# Patient Record
Sex: MALE | Race: WHITE | URBAN - METROPOLITAN AREA
[De-identification: names, ages, dates, MRNs, and addresses within clinical notes are randomized per-mention and may not be internally consistent; named-entity substitution may affect disease eponyms.]

---

## 2017-05-09 ENCOUNTER — OFFICE VISIT (OUTPATIENT)
Dept: RADIATION THERAPY | Facility: OUTPATIENT CENTER | Age: 77
End: 2017-05-09

## 2017-05-09 VITALS
WEIGHT: 197 LBS | DIASTOLIC BLOOD PRESSURE: 73 MMHG | BODY MASS INDEX: 29.95 KG/M2 | HEART RATE: 69 BPM | SYSTOLIC BLOOD PRESSURE: 147 MMHG

## 2017-05-09 DIAGNOSIS — C61 PROSTATE CANCER (H): Primary | ICD-10-CM

## 2017-05-09 NOTE — MR AVS SNAPSHOT
After Visit Summary   5/9/2017    Humza Santamaria    MRN: [id number]           Patient Information     Date Of Birth          1940        Visit Information        Provider Department      5/9/2017 11:15 AM Kiersten Solitario MD [facility]         Follow-ups after your visit        Your next 10 appointments already scheduled     May 08, 2018 11:15 AM CDT   Return Visit with Kiersten Solitario MD   [facility] ([facility])    [address], [address]  [place] [address]   [phone]              Who to contact     Please call your clinic at [phone] to:    Ask questions about your health    Make or cancel appointments    Discuss your medicines    Learn about your test results    Speak to your doctor   If you have compliments or concerns about an experience at your clinic, or if you wish to file a complaint, please contact [facility] Patient Relations at [phone] or email us at [email or web address]         Additional Information About Your Visit        Care EveryWhere ID     This is your Care EveryWhere ID. This could be used by other organizations to access your [place] medical records  [id number]        Your Vitals Were     Pulse BMI (Body Mass Index)                69 29.95 kg/m2           Blood Pressure from Last 3 Encounters:   05/09/17 147/73   05/10/16 166/90   05/12/15 142/83    Weight from Last 3 Encounters:   05/09/17 89.4 kg (197 lb)   05/10/16 91.6 kg (202 lb)   05/12/15 88.3 kg (194 lb 9.6 oz)              Today, you had the following     No orders found for display       Primary Care Provider Office Phone # Fax #    Autumn Rodriguez [phone]       [address] [address]        Thank you!     Thank you for choosing [facility]  for your care. Our goal is always to provide you with excellent care. Hearing back from our patients is one way we  can continue to improve our services. Please take a few minutes to complete the written survey that you may receive in the mail after your visit with us. Thank you!             Your Updated Medication List - Protect others around you: Learn how to safely use, store and throw away your medicines at www.disposemymeds.org.          This list is accurate as of: 5/9/17 11:31 AM.  Always use your most recent med list.                   Brand Name Dispense Instructions for use    aspirin 325 MG tablet      Take 325 mg by mouth daily.       lisinopril-hydrochlorothiazide 20-12.5 MG per tablet    PRINZIDE/ZESTORETIC     Take 1 tablet by mouth daily.       simvastatin 20 MG tablet    ZOCOR     Take 20 mg by mouth At Bedtime.

## 2017-05-09 NOTE — LETTER
5/9/2017      RE: Humza Santamaria  [address] [address]       DISEASE TREATED: Adenocarcinoma of the prostate, clinical stage [id number], Raúl score 3+3=6 and PSA previously low. With progression, new [place] score 3+4=7. After active surveillance, PSA 7.8. Status post brachytherapy alone.    RADIATION THERAPY DELIVERED: Implants only, 144 Gy using iodine-125 seeds    INTERVAL SINCE COMPLETION OF RT: Nearly 5 years since implantation on 07/13/2012     SUBJECTIVE:  Mr. Santamaria is a 76yo gentleman diagnosed with prostate cancer in 05/2010. Originally he had a PSA of 7.9 and a Raúl score of 3+3=6. At that time, he proceeded with active surveillance. He was followed with repeat biopsies and ultimately was found to have a Raúl score of 3+4=7 documented in 05/2012. His PSA prior to implant was 7.8 in 04/2012. His pre-implant AUA was 2, and his sexual function was low as he was not often sexually active; however, he did admit to potent erectile function prior to brachytherapy. After his implant, he was placed on Flomax for 2 months.    He now presents for routine followup. Since his last visit with us one year ago, he reports that he has continued to do very well. He has no pressing issues or complaints today. He denies any significant urinary or bowel symptoms. AUA score today is 3/35; he does not fill out his MONET score as he is not sexually active. Most recent PSA earlier this month was stable and low at 0.03. His remaining ROS are otherwise unremarkable.     PHYSICAL EXAM:  VITALS: /73 (BP Location: Left arm, Cuff Size: Adult Regular)  Pulse 69  Wt 89.4 kg (197 lb)  BMI 29.95 kg/m2  GEN: Appears well, alert, oriented, and in NAD  HEENT: NCAT, EOMI, normal conjunctiva  RESP: Breathing comfortably on room air  DIVINA: Deferred  SKIN: Normal color and turgor    LABS AND IMAGING: Reviewed.    IMPRESSION:   Mr. Santamaria is a 75 year old male with intermediate risk prostate cancer status post  definitive brachytherapy treatment, implanted almost 4 years ago. Symptomatically he is stable. He is biochemically and clinically MARI.     PLAN:  1. RTC in 1 year with PSA  2. Continue routine health maintenance with PCP    Tomasa Hina was seen and discussed with staff, Dr. Solitario.    Robson Bliss MD PGY-3  Radiation Oncology Resident, [facility]  Phone: [phone]      I have personally examined the patient, reviewed and edited the resident's note and agree with the plan of care.    Kiersten Solitario M.D., Ph.D.   Adjunct    Radiation Oncologist   [facility]   [facility]   Phone: [phone]      CC  Patient Care Team:  Autumn Rodriguez as PCP - General    Ariel Hernandez MD as MD (Urology)

## 2017-05-15 NOTE — PROGRESS NOTES
DISEASE TREATED: Adenocarcinoma of the prostate, clinical stage [id number], [place] score 3+3=6 and PSA previously low. With progression, new [place] score 3+4=7. After active surveillance, PSA 7.8. Status post brachytherapy alone.    RADIATION THERAPY DELIVERED: Implants only, 144 Gy using iodine-125 seeds    INTERVAL SINCE COMPLETION OF RT: Nearly 5 years since implantation on 07/13/2012     SUBJECTIVE:  Mr. Santamaria is a 76yo gentleman diagnosed with prostate cancer in 05/2010. Originally he had a PSA of 7.9 and a Raúl score of 3+3=6. At that time, he proceeded with active surveillance. He was followed with repeat biopsies and ultimately was found to have a Raúl score of 3+4=7 documented in 05/2012. His PSA prior to implant was 7.8 in 04/2012. His pre-implant AUA was 2, and his sexual function was low as he was not often sexually active; however, he did admit to potent erectile function prior to brachytherapy. After his implant, he was placed on Flomax for 2 months.    He now presents for routine followup. Since his last visit with us one year ago, he reports that he has continued to do very well. He has no pressing issues or complaints today. He denies any significant urinary or bowel symptoms. AUA score today is 3/35; he does not fill out his MONET score as he is not sexually active. Most recent PSA earlier this month was stable and low at 0.03. His remaining ROS are otherwise unremarkable.     PHYSICAL EXAM:  VITALS: /73 (BP Location: Left arm, Cuff Size: Adult Regular)  Pulse 69  Wt 89.4 kg (197 lb)  BMI 29.95 kg/m2  GEN: Appears well, alert, oriented, and in NAD  HEENT: NCAT, EOMI, normal conjunctiva  RESP: Breathing comfortably on room air  DIVINA: Deferred  SKIN: Normal color and turgor    LABS AND IMAGING: Reviewed.    IMPRESSION:   Mr. Santamaria is a 75 year old male with intermediate risk prostate cancer status post definitive brachytherapy treatment, implanted almost 4 years ago. Symptomatically  he is stable. He is biochemically and clinically MARI.     PLAN:  1. RTC in 1 year with PSA  2. Continue routine health maintenance with PCP    Mr. Santamaria was seen and discussed with staff, Dr. Solitario.    Robson Bliss MD PGY-3  Radiation Oncology Resident, [facility]  Phone: [phone]      I have personally examined the patient, reviewed and edited the resident's note and agree with the plan of care.    Kiersten Solitario M.D., Ph.D.   Adjunct    Radiation Oncologist   [facility]   [facility]   Phone: [phone]      CC  Patient Care Team:  Autumn Rodriguez as PCP - General  Kiersten Solitario MD as MD (Radiation Oncology)  Ariel Hernandez MD as MD (Urology)

## 2018-04-30 ENCOUNTER — OFFICE VISIT (OUTPATIENT)
Dept: RADIATION THERAPY | Facility: OUTPATIENT CENTER | Age: 78
End: 2018-04-30
Payer: COMMERCIAL

## 2018-04-30 VITALS
WEIGHT: 192.2 LBS | DIASTOLIC BLOOD PRESSURE: 77 MMHG | BODY MASS INDEX: 29.22 KG/M2 | RESPIRATION RATE: 14 BRPM | HEART RATE: 71 BPM | SYSTOLIC BLOOD PRESSURE: 138 MMHG

## 2018-04-30 DIAGNOSIS — C61 PROSTATE CANCER (H): Primary | ICD-10-CM

## 2018-04-30 ASSESSMENT — PAIN SCALES - GENERAL: PAINLEVEL: NO PAIN (0)

## 2018-04-30 NOTE — PROGRESS NOTES
DISEASE TREATED: Adenocarcinoma of the prostate, clinical stage [id number], Wells score 3+3=6 and PSA previously low. With progression, new Wells score 3+4=7. After active surveillance, PSA 7.8. Status post brachytherapy alone.    RADIATION THERAPY DELIVERED: Implants only, 144 Gy using iodine-125 seeds    INTERVAL SINCE COMPLETION OF RT: Nearly 6 years since implantation on 07/13/2012     SUBJECTIVE:  Mr. Santamaria is a 76yo gentleman diagnosed with prostate cancer in 05/2010. Originally he had a PSA of 7.9 and a Raúl score of 3+3=6. At that time, he proceeded with active surveillance. He was followed with repeat biopsies and ultimately was found to have a Raúl score of 3+4=7 documented in 05/2012. His PSA prior to implant was 7.8 in 04/2012. His pre-implant AUA was 2, and his sexual function was low as he was not often sexually active; however, he did admit to potent erectile function prior to brachytherapy. After his implant, he was placed on Flomax for 2 months.    He now presents for routine followup. Since his last visit with us one year ago, he reports that he has continued to do very well. He has no pressing issues or complaints today. He denies any significant urinary or bowel symptoms. AUA score today is 3/35; he does not fill out his MONET score as he is not sexually active. Most recent PSA earlier this month was stable and low at 0.02. His remaining ROS are otherwise unremarkable.     PHYSICAL EXAM:  VITALS: /77 (Patient Position: Left side, Cuff Size: Adult Large)  Pulse 71  Resp 14  Wt 87.2 kg (192 lb 3.2 oz)  BMI 29.22 kg/m2  GEN: Appears well, alert, oriented, and in NAD  RESP: Breathing comfortably on room air  Cardiac:  Heart sounds are regular.  DIVINA: Deferred  SKIN: Normal color and turgor    LABS AND IMAGING: Reviewed.    IMPRESSION:   Mr. Santamaria is a 75 year old male with intermediate risk prostate cancer status post definitive brachytherapy treatment, implanted almost 6 years  ago. Symptomatically he is stable. He is biochemically and clinically MARI.     PLAN:  1. RTC in 1 year with PSA  2. Continue routine health maintenance with PCP    Fariha Thomas NP  Radiation Oncology   [facility]   [facility]   Phone: [phone]      CC  Silvia Franco NP

## 2018-04-30 NOTE — LETTER
4/30/2018      RE: Humza Santamaria  [address] [address]       DISEASE TREATED: Adenocarcinoma of the prostate, clinical stage [id number], [place] score 3+3=6 and PSA previously low. With progression, new Raúl score 3+4=7. After active surveillance, PSA 7.8. Status post brachytherapy alone.    RADIATION THERAPY DELIVERED: Implants only, 144 Gy using iodine-125 seeds    INTERVAL SINCE COMPLETION OF RT: Nearly 6 years since implantation on 07/13/2012     SUBJECTIVE:  Mr. Santamaria is a 76yo gentleman diagnosed with prostate cancer in 05/2010. Originally he had a PSA of 7.9 and a Biggers score of 3+3=6. At that time, he proceeded with active surveillance. He was followed with repeat biopsies and ultimately was found to have a Biggers score of 3+4=7 documented in 05/2012. His PSA prior to implant was 7.8 in 04/2012. His pre-implant AUA was 2, and his sexual function was low as he was not often sexually active; however, he did admit to potent erectile function prior to brachytherapy. After his implant, he was placed on Flomax for 2 months.    He now presents for routine followup. Since his last visit with us one year ago, he reports that he has continued to do very well. He has no pressing issues or complaints today. He denies any significant urinary or bowel symptoms. AUA score today is 3/35; he does not fill out his MONET score as he is not sexually active. Most recent PSA earlier this month was stable and low at 0.02. His remaining ROS are otherwise unremarkable.     PHYSICAL EXAM:  VITALS: /77 (Patient Position: Left side, Cuff Size: Adult Large)  Pulse 71  Resp 14  Wt 87.2 kg (192 lb 3.2 oz)  BMI 29.22 kg/m2  GEN: Appears well, alert, oriented, and in NAD  RESP: Breathing comfortably on room air  Cardiac:  Heart sounds are regular.  DIVINA: Deferred  SKIN: Normal color and turgor    LABS AND IMAGING: Reviewed.    IMPRESSION:   Mr. Santamaria is a 75 year old male with intermediate risk prostate  cancer status post definitive brachytherapy treatment, implanted almost 6 years ago. Symptomatically he is stable. He is biochemically and clinically MARI.     PLAN:  1. RTC in 1 year with PSA  2. Continue routine health maintenance with PCP    Fariha Thomas NP  Radiation Oncology   [facility]   [facility]   Phone: [phone]      CC  Silvia Franco NP

## 2018-04-30 NOTE — MR AVS SNAPSHOT
After Visit Summary   4/30/2018    Humza Santamaria    MRN: [id number]           Patient Information     Date Of Birth          1940        Visit Information        Provider Department      4/30/2018 1:00 PM Fariha Thomas APRN CNP [facility]        Today's Diagnoses     Prostate cancer (H)    -  1       Follow-ups after your visit        Your next 10 appointments already scheduled     Apr 30, 2019 11:30 AM CDT   Return Visit with [facility] Provider   [facility] ([facility])    [address], [address]  [place] [address]   [phone]              Future tests that were ordered for you today     Open Future Orders        Priority Expected Expires Ordered    PSA tumor marker Routine 4/30/2019 4/30/2019 4/30/2018            Who to contact     Please call your clinic at [phone] to:    Ask questions about your health    Make or cancel appointments    Discuss your medicines    Learn about your test results    Speak to your doctor            Additional Information About Your Visit        Care EveryWhere ID     This is your Care EveryWhere ID. This could be used by other organizations to access your [place] medical records  [id number]        Your Vitals Were     Pulse Respirations BMI (Body Mass Index)             71 14 29.22 kg/m2          Blood Pressure from Last 3 Encounters:   04/30/18 138/77   05/09/17 147/73   05/10/16 166/90    Weight from Last 3 Encounters:   04/30/18 87.2 kg (192 lb 3.2 oz)   05/09/17 89.4 kg (197 lb)   05/10/16 91.6 kg (202 lb)               Primary Care Provider    None Specified       No primary provider on file.        Equal Access to Services     [facility] KEARA : Hadii rosemary Townsend, wajeffda luqadaha, qaybta kaalmada ademanish, king soriano. [facility] [phone].    ATENCIÓN: Si habla español, tiene a markham disposición servicios gratuitos de asistencia lingüística. Llame al  [phone].    We comply with applicable federal civil rights laws and [place] laws. We do not discriminate on the basis of race, color, national origin, age, disability, sex, sexual orientation, or gender identity.            Thank you!     Thank you for choosing [facility]  for your care. Our goal is always to provide you with excellent care. Hearing back from our patients is one way we can continue to improve our services. Please take a few minutes to complete the written survey that you may receive in the mail after your visit with us. Thank you!             Your Updated Medication List - Protect others around you: Learn how to safely use, store and throw away your medicines at www.disposemymeds.org.          This list is accurate as of 4/30/18  2:01 PM.  Always use your most recent med list.                   Brand Name Dispense Instructions for use Diagnosis    aspirin 325 MG tablet      Take 325 mg by mouth daily.        lisinopril-hydrochlorothiazide 20-12.5 MG per tablet    PRINZIDE/ZESTORETIC     Take 1 tablet by mouth daily.        simvastatin 20 MG tablet    ZOCOR     Take 20 mg by mouth At Bedtime.

## 2018-10-17 ENCOUNTER — TELEPHONE (OUTPATIENT)
Dept: RADIATION THERAPY | Facility: OUTPATIENT CENTER | Age: 78
End: 2018-10-17

## 2018-10-17 NOTE — TELEPHONE ENCOUNTER
Appointment reminder call made. At that time patient stated his local PCP will be checking his PSA and following him for hx of prostate cancer treatment. Per RN, this is appropriate care for him and he is aware he can call back or visit at any time if questions arise.  He will be followed on a PRN basis as he is following locally.